# Patient Record
Sex: FEMALE | Race: WHITE | NOT HISPANIC OR LATINO | URBAN - METROPOLITAN AREA
[De-identification: names, ages, dates, MRNs, and addresses within clinical notes are randomized per-mention and may not be internally consistent; named-entity substitution may affect disease eponyms.]

---

## 2023-06-12 ENCOUNTER — APPOINTMENT (OUTPATIENT)
Dept: PRIMARY CARE | Facility: CLINIC | Age: 48
End: 2023-06-12
Payer: COMMERCIAL

## 2023-06-14 ENCOUNTER — OFFICE VISIT (OUTPATIENT)
Dept: PRIMARY CARE | Facility: CLINIC | Age: 48
End: 2023-06-14
Payer: COMMERCIAL

## 2023-06-14 VITALS
HEIGHT: 67 IN | SYSTOLIC BLOOD PRESSURE: 112 MMHG | BODY MASS INDEX: 24.55 KG/M2 | WEIGHT: 156.4 LBS | HEART RATE: 78 BPM | DIASTOLIC BLOOD PRESSURE: 72 MMHG

## 2023-06-14 DIAGNOSIS — R07.89 CHEST DISCOMFORT: ICD-10-CM

## 2023-06-14 DIAGNOSIS — Z15.89 MTHFR MUTATION: Primary | ICD-10-CM

## 2023-06-14 DIAGNOSIS — Z82.49 FAMILY HISTORY OF ABDOMINAL AORTIC ANEURYSM (AAA): ICD-10-CM

## 2023-06-14 DIAGNOSIS — R10.9 ABDOMINAL DISCOMFORT: ICD-10-CM

## 2023-06-14 PROBLEM — E55.9 VITAMIN D DEFICIENCY: Status: ACTIVE | Noted: 2023-06-14

## 2023-06-14 PROCEDURE — 1036F TOBACCO NON-USER: CPT | Performed by: FAMILY MEDICINE

## 2023-06-14 PROCEDURE — 99214 OFFICE O/P EST MOD 30 MIN: CPT | Performed by: FAMILY MEDICINE

## 2023-06-14 RX ORDER — PYRIDOXINE HCL (VITAMIN B6) 100 MG
50 TABLET ORAL DAILY
COMMUNITY

## 2023-06-14 RX ORDER — LANOLIN ALCOHOL/MO/W.PET/CERES
1000 CREAM (GRAM) TOPICAL DAILY
COMMUNITY

## 2023-06-14 RX ORDER — FOLIC ACID 1 MG/1
1 TABLET ORAL DAILY
COMMUNITY

## 2023-06-14 RX ORDER — ASPIRIN 81 MG/1
81 TABLET ORAL DAILY
COMMUNITY

## 2023-06-14 ASSESSMENT — ENCOUNTER SYMPTOMS
FATIGUE: 0
BACK PAIN: 0
CHEST TIGHTNESS: 0
ARTHRALGIAS: 0
CHOKING: 0
FACIAL ASYMMETRY: 0
HEADACHES: 0
ACTIVITY CHANGE: 0
COLOR CHANGE: 0
LIGHT-HEADEDNESS: 0
ABDOMINAL PAIN: 1
DIZZINESS: 0
COUGH: 0
APPETITE CHANGE: 0
FEVER: 0
MYALGIAS: 0
PALPITATIONS: 0
ABDOMINAL DISTENTION: 1

## 2023-06-14 NOTE — PROGRESS NOTES
"Subjective   Patient ID: Jacqueline Boswell is a 48 y.o. female who presents for To discuss CT scan to check for aortic aneursym. .    HPI   Patient reports that her father was just told he had a aortic aneurysm.  She is very concerned about this and wants screened for an aortic aneurysm.    She explains that her Dad had a thoracic and abdominal aortic aneurysm. She was told that his abdominal aortic aneurysm ruptured. She is very concerned that she might have something like that.   He was a smoker.    His mom was 70 when paternal grandomother  of a brain aneurysm.     Review of Systems   Constitutional:  Negative for activity change, appetite change, fatigue and fever.   HENT:  Negative for congestion.    Respiratory:  Negative for cough, choking and chest tightness.    Cardiovascular:  Negative for chest pain, palpitations and leg swelling.   Gastrointestinal:  Positive for abdominal distention and abdominal pain.   Musculoskeletal:  Negative for arthralgias, back pain, gait problem and myalgias.   Skin:  Negative for color change and pallor.   Neurological:  Negative for dizziness, facial asymmetry, light-headedness and headaches.       Objective   /72 (BP Location: Left arm)   Pulse 78   Ht 1.702 m (5' 7\")   Wt 70.9 kg (156 lb 6.4 oz)   BMI 24.50 kg/m²   BSA Body surface area is 1.83 meters squared.      Physical Exam  Constitutional:       General: She is not in acute distress.     Appearance: Normal appearance. She is not toxic-appearing.   HENT:      Head: Normocephalic.      Right Ear: Tympanic membrane, ear canal and external ear normal.      Left Ear: Tympanic membrane, ear canal and external ear normal.      Nose: Nose normal.      Mouth/Throat:      Pharynx: Oropharynx is clear.   Eyes:      Conjunctiva/sclera: Conjunctivae normal.      Pupils: Pupils are equal, round, and reactive to light.   Cardiovascular:      Rate and Rhythm: Normal rate and regular rhythm.      Pulses: Normal pulses. "      Heart sounds: Normal heart sounds.   Pulmonary:      Effort: No respiratory distress.      Breath sounds: No wheezing, rhonchi or rales.   Musculoskeletal:         General: No swelling or tenderness.      Cervical back: No tenderness.   Skin:     Findings: No lesion or rash.   Neurological:      General: No focal deficit present.      Mental Status: She is alert and oriented to person, place, and time. Mental status is at baseline.      Gait: Gait normal.   Psychiatric:         Mood and Affect: Mood normal.         Behavior: Behavior normal.         Thought Content: Thought content normal.         Judgment: Judgment normal.       [facility] Encounter on 11/17/2022   Component Date Value Ref Range Status    WBC 11/17/2022 6.6  4.4 - 11.3 x10E9/L Final    nRBC 11/17/2022 0.0  0.0 - 0.0 /100 WBC Final    RBC 11/17/2022 3.93 (L)  4.00 - 5.20 x10E12/L Final    Hemoglobin 11/17/2022 13.1  12.0 - 16.0 g/dL Final    Hematocrit 11/17/2022 38.4  36.0 - 46.0 % Final    MCV 11/17/2022 98  80 - 100 fL Final    MCHC 11/17/2022 34.1  32.0 - 36.0 g/dL Final    Platelets 11/17/2022 314  150 - 450 x10E9/L Final    RDW 11/17/2022 12.4  11.5 - 14.5 % Final    Neutrophils % 11/17/2022 68.9  40.0 - 80.0 % Final    Immature Granulocytes %, Automated 11/17/2022 0.3  0.0 - 0.9 % Final    Comment:  Immature Granulocyte Count (IG) includes promyelocytes,    myelocytes and metamyelocytes but does not include bands.   Percent differential counts (%) should be interpreted in the   context of the absolute cell counts (cells/L).      Lymphocytes % 11/17/2022 24.8  13.0 - 44.0 % Final    Monocytes % 11/17/2022 5.0  2.0 - 10.0 % Final    Eosinophils % 11/17/2022 0.5  0.0 - 6.0 % Final    Basophils % 11/17/2022 0.5  0.0 - 2.0 % Final    Neutrophils Absolute 11/17/2022 4.56  1.20 - 7.70 x10E9/L Final    Lymphocytes Absolute 11/17/2022 1.64  1.20 - 4.80 x10E9/L Final    Monocytes Absolute 11/17/2022 0.33  0.10 - 1.00 x10E9/L Final    Eosinophils  Absolute 11/17/2022 0.03  0.00 - 0.70 x10E9/L Final    Basophils Absolute 11/17/2022 0.03  0.00 - 0.10 x10E9/L Final    Vitamin D, 25-Hydroxy 11/17/2022 37  ng/mL Final    Comment: .  DEFICIENCY:         < 20   NG/ML  INSUFFICIENCY:      20-29  NG/ML  SUFFICIENCY:         NG/ML    THIS ASSAY ACCURATELY QUANTIFIES THE SUM OF  VITAMIN D3, 25-HYDROXY AND VIT D2,25-HYDROXY.      Glucose 11/17/2022 85  74 - 99 mg/dL Final    Sodium 11/17/2022 141  136 - 145 mmol/L Final    Potassium 11/17/2022 4.2  3.5 - 5.3 mmol/L Final    Chloride 11/17/2022 103  98 - 107 mmol/L Final    Bicarbonate 11/17/2022 29  21 - 32 mmol/L Final    Anion Gap 11/17/2022 13  10 - 20 mmol/L Final    Urea Nitrogen 11/17/2022 17  6 - 23 mg/dL Final    Creatinine 11/17/2022 0.83  0.50 - 1.05 mg/dL Final    GFR Female 11/17/2022 87  >90 mL/min/1.73m2 Final    Comment:  CALCULATIONS OF ESTIMATED GFR ARE PERFORMED   USING THE 2021 CKD-EPI STUDY REFIT EQUATION   WITHOUT THE RACE VARIABLE FOR THE IDMS-TRACEABLE   CREATININE METHODS.    https://jasn.asnjournals.org/content/early/2021/09/22/ASN.5541437380      Calcium 11/17/2022 9.8  8.6 - 10.6 mg/dL Final    Albumin 11/17/2022 4.5  3.4 - 5.0 g/dL Final    Alkaline Phosphatase 11/17/2022 60  33 - 110 U/L Final    Total Protein 11/17/2022 7.0  6.4 - 8.2 g/dL Final    AST 11/17/2022 19  9 - 39 U/L Final    Total Bilirubin 11/17/2022 0.7  0.0 - 1.2 mg/dL Final    ALT (SGPT) 11/17/2022 18  7 - 45 U/L Final    Comment:  Patients treated with Sulfasalazine may generate    falsely decreased results for ALT.      Cholesterol 11/17/2022 233 (H)  0 - 199 mg/dL Final    Comment: .      AGE      DESIRABLE   BORDERLINE HIGH   HIGH     0-19 Y     0 - 169       170 - 199     >/= 200    20-24 Y     0 - 189       190 - 224     >/= 225         >24 Y     0 - 199       200 - 239     >/= 240   **All ranges are based on fasting samples. Specific   therapeutic targets will vary based on patient-specific   cardiac risk.  .    Pediatric guidelines reference:Pediatrics 2011, 128(S5).   Adult guidelines reference: NCEP ATPIII Guidelines,     YESENIA 2001, 258:2486-97  .   Venipuncture immediately after or during the    administration of Metamizole may lead to falsely   low results. Testing should be performed immediately   prior to Metamizole dosing.      HDL 11/17/2022 74.5  mg/dL Final    Comment: .      AGE      VERY LOW   LOW     NORMAL    HIGH       0-19 Y       < 35   < 40     40-45     ----    20-24 Y       ----   < 40       >45     ----      >24 Y       ----   < 40     40-60      >60  .      Cholesterol/HDL Ratio 11/17/2022 3.1   Final    Comment: REF VALUES  DESIRABLE  < 3.4  HIGH RISK  > 5.0      LDL 11/17/2022 145 (H)  0 - 99 mg/dL Final    Comment: .                           NEAR      BORD      AGE      DESIRABLE  OPTIMAL    HIGH     HIGH     VERY HIGH     0-19 Y     0 - 109     ---    110-129   >/= 130     ----    20-24 Y     0 - 119     ---    120-159   >/= 160     ----      >24 Y     0 -  99   100-129  130-159   160-189     >/=190  .      VLDL 11/17/2022 13  0 - 40 mg/dL Final    Triglycerides 11/17/2022 66  0 - 149 mg/dL Final    Comment: .      AGE      DESIRABLE   BORDERLINE HIGH   HIGH     VERY HIGH   0 D-90 D    19 - 174         ----         ----        ----  91 D- 9 Y     0 -  74        75 -  99     >/= 100      ----    10-19 Y     0 -  89        90 - 129     >/= 130      ----    20-24 Y     0 - 114       115 - 149     >/= 150      ----         >24 Y     0 - 149       150 - 199    200- 499    >/= 500  .   Venipuncture immediately after or during the    administration of Metamizole may lead to falsely   low results. Testing should be performed immediately   prior to Metamizole dosing.      TSH 11/17/2022 1.61  0.44 - 3.98 mIU/L Final    Comment:  TSH testing is performed using different testing    methodology at [facility] than at other    [facility]. Direct result comparisons should    only be made  within the same method.       Current Outpatient Medications on File Prior to Visit   Medication Sig Dispense Refill    aspirin 81 mg EC tablet Take 1 tablet (81 mg) by mouth once daily.      cyanocobalamin (Vitamin B-12) 1,000 mcg tablet Take 1 tablet (1,000 mcg) by mouth once daily.      folic acid (Folvite) 1 mg tablet Take 1 tablet (1 mg) by mouth once daily.      pyridoxine (Vitamin B-6) 100 mg tablet Take 0.5 tablets (50 mg) by mouth once daily.       No current facility-administered medications on file prior to visit.     No images are attached to the encounter.            Assessment/Plan   Diagnoses and all orders for this visit:  MTHFR mutation (CMS/HCC)  Abdominal discomfort  Chest discomfort  1.  Because of dad's recent abdominal aortic aneurysm rupture patient would like to know what her risk is is going to be referred to genetic counseling at [facility] for aneurysm  2.  Patient to have CT abdomen and pelvis as well as chest for rule out aneurysm  3.  Patient to call if questions or concerns

## 2023-06-27 ENCOUNTER — TELEPHONE (OUTPATIENT)
Dept: PRIMARY CARE | Facility: CLINIC | Age: 48
End: 2023-06-27
Payer: COMMERCIAL

## 2023-06-27 DIAGNOSIS — Z82.49 FAMILY HISTORY OF ABDOMINAL AORTIC ANEURYSM (AAA): ICD-10-CM

## 2023-06-27 DIAGNOSIS — R07.89 CHEST DISCOMFORT: ICD-10-CM

## 2023-06-27 NOTE — TELEPHONE ENCOUNTER
This was from the email, do you want me to schedule her with Cardiology next door ?   I knew that this would get denied. Patient was aware that this probably would get a night as well. Can we try to do instead a echocardiogram and a ultrasound of abdomen to rule out aneurysm for both scans if this does not go through insurance then we will have to send her to cardiology so that they can get imaging For patient.     I am not going to do the peer to peer because it will not get covered. I did already talk to patient and tell her at the visit when I ordered the scans that this would probably be the case.

## 2023-06-27 NOTE — TELEPHONE ENCOUNTER
Pt called because she had a CT scheduled for tomorrow and just found out the insurance denied it because they had not heard back from you, Pt would like to know what she should do and if it can be resolved and to notify her of that.

## 2023-07-13 ENCOUNTER — TELEPHONE (OUTPATIENT)
Dept: PRIMARY CARE | Facility: CLINIC | Age: 48
End: 2023-07-13
Payer: COMMERCIAL

## 2024-05-16 ENCOUNTER — TELEPHONE (OUTPATIENT)
Dept: PRIMARY CARE | Facility: CLINIC | Age: 49
End: 2024-05-16
Payer: COMMERCIAL

## 2024-05-16 DIAGNOSIS — Z00.00 HEALTHCARE MAINTENANCE: ICD-10-CM

## 2024-05-16 DIAGNOSIS — E55.9 VITAMIN D DEFICIENCY: ICD-10-CM

## 2024-05-16 PROBLEM — E78.5 DYSLIPIDEMIA, GOAL LDL BELOW 100: Status: ACTIVE | Noted: 2024-05-16

## 2024-05-16 PROBLEM — R94.31 ABNORMAL ECG: Status: ACTIVE | Noted: 2024-05-16

## 2024-05-16 PROBLEM — I70.0 ARTERIOSCLEROSIS OF AORTA (CMS-HCC): Status: ACTIVE | Noted: 2024-05-16

## 2024-07-03 ENCOUNTER — LAB (OUTPATIENT)
Dept: LAB | Facility: LAB | Age: 49
End: 2024-07-03
Payer: COMMERCIAL

## 2024-07-03 DIAGNOSIS — Z00.00 HEALTHCARE MAINTENANCE: ICD-10-CM

## 2024-07-03 DIAGNOSIS — E55.9 VITAMIN D DEFICIENCY: ICD-10-CM

## 2024-07-03 LAB
25(OH)D3 SERPL-MCNC: 54 NG/ML (ref 30–100)
ALBUMIN SERPL BCP-MCNC: 4.9 G/DL (ref 3.4–5)
ALP SERPL-CCNC: 59 U/L (ref 33–110)
ALT SERPL W P-5'-P-CCNC: 22 U/L (ref 7–45)
ANION GAP SERPL CALC-SCNC: 14 MMOL/L (ref 10–20)
AST SERPL W P-5'-P-CCNC: 20 U/L (ref 9–39)
BASOPHILS # BLD AUTO: 0.03 X10*3/UL (ref 0–0.1)
BASOPHILS NFR BLD AUTO: 0.8 %
BILIRUB SERPL-MCNC: 0.8 MG/DL (ref 0–1.2)
BUN SERPL-MCNC: 14 MG/DL (ref 6–23)
CALCIUM SERPL-MCNC: 9.8 MG/DL (ref 8.6–10.6)
CHLORIDE SERPL-SCNC: 106 MMOL/L (ref 98–107)
CHOLEST SERPL-MCNC: 194 MG/DL (ref 0–199)
CHOLESTEROL/HDL RATIO: 2.2
CO2 SERPL-SCNC: 28 MMOL/L (ref 21–32)
CREAT SERPL-MCNC: 0.91 MG/DL (ref 0.5–1.05)
EGFRCR SERPLBLD CKD-EPI 2021: 77 ML/MIN/1.73M*2
EOSINOPHIL # BLD AUTO: 0.03 X10*3/UL (ref 0–0.7)
EOSINOPHIL NFR BLD AUTO: 0.8 %
ERYTHROCYTE [DISTWIDTH] IN BLOOD BY AUTOMATED COUNT: 12.5 % (ref 11.5–14.5)
GLUCOSE SERPL-MCNC: 96 MG/DL (ref 74–99)
HCT VFR BLD AUTO: 37.9 % (ref 36–46)
HDLC SERPL-MCNC: 87.1 MG/DL
HGB BLD-MCNC: 12.7 G/DL (ref 12–16)
IMM GRANULOCYTES # BLD AUTO: 0 X10*3/UL (ref 0–0.7)
IMM GRANULOCYTES NFR BLD AUTO: 0 % (ref 0–0.9)
LDLC SERPL CALC-MCNC: 94 MG/DL
LYMPHOCYTES # BLD AUTO: 1.53 X10*3/UL (ref 1.2–4.8)
LYMPHOCYTES NFR BLD AUTO: 39.3 %
MCH RBC QN AUTO: 32 PG (ref 26–34)
MCHC RBC AUTO-ENTMCNC: 33.5 G/DL (ref 32–36)
MCV RBC AUTO: 96 FL (ref 80–100)
MONOCYTES # BLD AUTO: 0.27 X10*3/UL (ref 0.1–1)
MONOCYTES NFR BLD AUTO: 6.9 %
NEUTROPHILS # BLD AUTO: 2.03 X10*3/UL (ref 1.2–7.7)
NEUTROPHILS NFR BLD AUTO: 52.2 %
NON HDL CHOLESTEROL: 107 MG/DL (ref 0–149)
NRBC BLD-RTO: 0 /100 WBCS (ref 0–0)
PLATELET # BLD AUTO: 268 X10*3/UL (ref 150–450)
POTASSIUM SERPL-SCNC: 4.1 MMOL/L (ref 3.5–5.3)
PROT SERPL-MCNC: 6.9 G/DL (ref 6.4–8.2)
RBC # BLD AUTO: 3.97 X10*6/UL (ref 4–5.2)
SODIUM SERPL-SCNC: 144 MMOL/L (ref 136–145)
TRIGL SERPL-MCNC: 64 MG/DL (ref 0–149)
TSH SERPL-ACNC: 2.16 MIU/L (ref 0.44–3.98)
VLDL: 13 MG/DL (ref 0–40)
WBC # BLD AUTO: 3.9 X10*3/UL (ref 4.4–11.3)

## 2024-07-03 PROCEDURE — 84443 ASSAY THYROID STIM HORMONE: CPT

## 2024-07-03 PROCEDURE — 36415 COLL VENOUS BLD VENIPUNCTURE: CPT

## 2024-07-03 PROCEDURE — 80053 COMPREHEN METABOLIC PANEL: CPT

## 2024-07-03 PROCEDURE — 85025 COMPLETE CBC W/AUTO DIFF WBC: CPT

## 2024-07-03 PROCEDURE — 82306 VITAMIN D 25 HYDROXY: CPT

## 2024-07-03 PROCEDURE — 80061 LIPID PANEL: CPT

## 2024-07-19 ENCOUNTER — APPOINTMENT (OUTPATIENT)
Dept: PRIMARY CARE | Facility: CLINIC | Age: 49
End: 2024-07-19
Payer: COMMERCIAL

## 2024-07-19 VITALS
DIASTOLIC BLOOD PRESSURE: 72 MMHG | HEIGHT: 67 IN | SYSTOLIC BLOOD PRESSURE: 118 MMHG | HEART RATE: 89 BPM | BODY MASS INDEX: 24.64 KG/M2 | WEIGHT: 157 LBS

## 2024-07-19 DIAGNOSIS — R79.89 ABNORMAL CBC: ICD-10-CM

## 2024-07-19 DIAGNOSIS — Z15.89 MTHFR MUTATION: ICD-10-CM

## 2024-07-19 DIAGNOSIS — E72.12 METHYLENETETRAHYDROFOLATE REDUCTASE (MTHFR) DEFICIENCY (MULTI): ICD-10-CM

## 2024-07-19 DIAGNOSIS — Z12.31 ENCOUNTER FOR SCREENING MAMMOGRAM FOR MALIGNANT NEOPLASM OF BREAST: ICD-10-CM

## 2024-07-19 DIAGNOSIS — E78.5 DYSLIPIDEMIA, GOAL LDL BELOW 100: ICD-10-CM

## 2024-07-19 DIAGNOSIS — E55.9 VITAMIN D DEFICIENCY: ICD-10-CM

## 2024-07-19 DIAGNOSIS — I70.0 ARTERIOSCLEROSIS OF AORTA (CMS-HCC): ICD-10-CM

## 2024-07-19 DIAGNOSIS — Z00.00 HEALTHCARE MAINTENANCE: Primary | ICD-10-CM

## 2024-07-19 PROCEDURE — 3008F BODY MASS INDEX DOCD: CPT | Performed by: FAMILY MEDICINE

## 2024-07-19 PROCEDURE — 1036F TOBACCO NON-USER: CPT | Performed by: FAMILY MEDICINE

## 2024-07-19 PROCEDURE — 99396 PREV VISIT EST AGE 40-64: CPT | Performed by: FAMILY MEDICINE

## 2024-07-19 RX ORDER — ROSUVASTATIN CALCIUM 10 MG/1
10 TABLET, COATED ORAL NIGHTLY
COMMUNITY

## 2024-07-19 ASSESSMENT — ENCOUNTER SYMPTOMS
FACIAL ASYMMETRY: 0
HEADACHES: 0
LIGHT-HEADEDNESS: 0
ARTHRALGIAS: 0
COLOR CHANGE: 0
COUGH: 0
APPETITE CHANGE: 0
CHEST TIGHTNESS: 0
PALPITATIONS: 0
FATIGUE: 0
FEVER: 0
ACTIVITY CHANGE: 0
DIZZINESS: 0
BACK PAIN: 0
CHOKING: 0

## 2024-07-19 NOTE — PROGRESS NOTES
"Subjective   Patient ID: Jacqueline Boswell is a 49 y.o. female who presents for Annual Exam.    HPI   Patient presents for physical exam.      Fam Hx  MGF melanoma, squamous cell CA, basal cell CA  Mom (67) living,  Dad (75) living,  Sister () living, melanoma  Sister () living,      Exercise runs 1/2 marathons, walks, swims  ETOH once a week  Caffeine 1 can a day of soda  Tobacco denies, 2nd hand from dad     Dr. Suárez, dermatology  OB/GYN Dr. Ted Agustin, hematologist     Mammogram due 7-2024  DEXA 2024 due at age 64  Colonoscopy 2021 Dr. Hagen due 2024     adopted daughter     Past Med Hx  MTHFR  endometriosis     Past Surg Hx  3 D+C's  laparoscopy     Patient denies other complaints.  Review of Systems   Constitutional:  Negative for activity change, appetite change, fatigue and fever.   HENT:  Negative for congestion.    Respiratory:  Negative for cough, choking and chest tightness.    Cardiovascular:  Negative for chest pain, palpitations and leg swelling.   Musculoskeletal:  Negative for arthralgias, back pain and gait problem.   Skin:  Negative for color change and pallor.   Neurological:  Negative for dizziness, facial asymmetry, light-headedness and headaches.       Objective   /72 (BP Location: Right arm, Patient Position: Sitting)   Pulse 89   Ht 1.695 m (5' 6.75\")   Wt 71.2 kg (157 lb)   BMI 24.77 kg/m²   BSA Body surface area is 1.83 meters squared.      Physical Exam  Constitutional:       General: She is not in acute distress.     Appearance: Normal appearance. She is not toxic-appearing.   HENT:      Head: Normocephalic.      Right Ear: Tympanic membrane, ear canal and external ear normal.      Left Ear: Tympanic membrane, ear canal and external ear normal.   Eyes:      Conjunctiva/sclera: Conjunctivae normal.      Pupils: Pupils are equal, round, and reactive to light.   Cardiovascular:      Rate and Rhythm: Normal rate and regular rhythm.      Pulses: Normal pulses.      Heart " sounds: Normal heart sounds.   Pulmonary:      Effort: No respiratory distress.      Breath sounds: No wheezing, rhonchi or rales.   Abdominal:      General: Bowel sounds are normal. There is no distension.      Palpations: Abdomen is soft.      Tenderness: There is no abdominal tenderness.   Musculoskeletal:         General: No swelling or tenderness.   Skin:     Findings: No lesion or rash.   Neurological:      General: No focal deficit present.      Mental Status: She is alert and oriented to person, place, and time. Mental status is at baseline.      Gait: Gait normal.   Psychiatric:         Mood and Affect: Mood normal.         Behavior: Behavior normal.         Thought Content: Thought content normal.         Judgment: Judgment normal.       Lab on 07/03/2024   Component Date Value Ref Range Status    WBC 07/03/2024 3.9 (L)  4.4 - 11.3 x10*3/uL Final    nRBC 07/03/2024 0.0  0.0 - 0.0 /100 WBCs Final    RBC 07/03/2024 3.97 (L)  4.00 - 5.20 x10*6/uL Final    Hemoglobin 07/03/2024 12.7  12.0 - 16.0 g/dL Final    Hematocrit 07/03/2024 37.9  36.0 - 46.0 % Final    MCV 07/03/2024 96  80 - 100 fL Final    MCH 07/03/2024 32.0  26.0 - 34.0 pg Final    MCHC 07/03/2024 33.5  32.0 - 36.0 g/dL Final    RDW 07/03/2024 12.5  11.5 - 14.5 % Final    Platelets 07/03/2024 268  150 - 450 x10*3/uL Final    Neutrophils % 07/03/2024 52.2  40.0 - 80.0 % Final    Immature Granulocytes %, Automated 07/03/2024 0.0  0.0 - 0.9 % Final    Immature Granulocyte Count (IG) includes promyelocytes, myelocytes and metamyelocytes but does not include bands. Percent differential counts (%) should be interpreted in the context of the absolute cell counts (cells/UL).    Lymphocytes % 07/03/2024 39.3  13.0 - 44.0 % Final    Monocytes % 07/03/2024 6.9  2.0 - 10.0 % Final    Eosinophils % 07/03/2024 0.8  0.0 - 6.0 % Final    Basophils % 07/03/2024 0.8  0.0 - 2.0 % Final    Neutrophils Absolute 07/03/2024 2.03  1.20 - 7.70 x10*3/uL Final    Percent  differential counts (%) should be interpreted in the context of the absolute cell counts (cells/uL).    Immature Granulocytes Absolute, Au* 07/03/2024 0.00  0.00 - 0.70 x10*3/uL Final    Lymphocytes Absolute 07/03/2024 1.53  1.20 - 4.80 x10*3/uL Final    Monocytes Absolute 07/03/2024 0.27  0.10 - 1.00 x10*3/uL Final    Eosinophils Absolute 07/03/2024 0.03  0.00 - 0.70 x10*3/uL Final    Basophils Absolute 07/03/2024 0.03  0.00 - 0.10 x10*3/uL Final    Glucose 07/03/2024 96  74 - 99 mg/dL Final    Sodium 07/03/2024 144  136 - 145 mmol/L Final    Potassium 07/03/2024 4.1  3.5 - 5.3 mmol/L Final    Chloride 07/03/2024 106  98 - 107 mmol/L Final    Bicarbonate 07/03/2024 28  21 - 32 mmol/L Final    Anion Gap 07/03/2024 14  10 - 20 mmol/L Final    Urea Nitrogen 07/03/2024 14  6 - 23 mg/dL Final    Creatinine 07/03/2024 0.91  0.50 - 1.05 mg/dL Final    eGFR 07/03/2024 77  >60 mL/min/1.73m*2 Final    Calculations of estimated GFR are performed using the 2021 CKD-EPI Study Refit equation without the race variable for the IDMS-Traceable creatinine methods.  https://jasn.asnjournals.org/content/early/2021/09/22/ASN.6016989400    Calcium 07/03/2024 9.8  8.6 - 10.6 mg/dL Final    Albumin 07/03/2024 4.9  3.4 - 5.0 g/dL Final    Alkaline Phosphatase 07/03/2024 59  33 - 110 U/L Final    Total Protein 07/03/2024 6.9  6.4 - 8.2 g/dL Final    AST 07/03/2024 20  9 - 39 U/L Final    Bilirubin, Total 07/03/2024 0.8  0.0 - 1.2 mg/dL Final    ALT 07/03/2024 22  7 - 45 U/L Final    Patients treated with Sulfasalazine may generate falsely decreased results for ALT.    Cholesterol 07/03/2024 194  0 - 199 mg/dL Final          Age      Desirable   Borderline High   High     0-19 Y     0 - 169       170 - 199     >/= 200    20-24 Y     0 - 189       190 - 224     >/= 225         >24 Y     0 - 199       200 - 239     >/= 240   **All ranges are based on fasting samples. Specific   therapeutic targets will vary based on patient-specific   cardiac  risk.    Pediatric guidelines reference:Pediatrics 2011, 128(S5).Adult guidelines reference: NCEP ATPIII Guidelines,YESENIA 2001, 258:7936-    Venipuncture immediately after or during the administration of Metamizole may lead to falsely low results. Testing should be performed immediately prior to Metamizole dosing.    HDL-Cholesterol 07/03/2024 87.1  mg/dL Final      Age       Very Low   Low     Normal    High    0-19 Y    < 35      < 40     40-45     ----  20-24 Y    ----     < 40      >45      ----        >24 Y      ----     < 40     40-60      >60      Cholesterol/HDL Ratio 07/03/2024 2.2   Final      Ref Values  Desirable  < 3.4  High Risk  > 5.0    LDL Calculated 07/03/2024 94  <=99 mg/dL Final                                Near   Borderline      AGE      Desirable  Optimal    High     High     Very High     0-19 Y     0 - 109     ---    110-129   >/= 130     ----    20-24 Y     0 - 119     ---    120-159   >/= 160     ----      >24 Y     0 -  99   100-129  130-159   160-189     >/=190      VLDL 07/03/2024 13  0 - 40 mg/dL Final    Triglycerides 07/03/2024 64  0 - 149 mg/dL Final       Age         Desirable   Borderline High   High     Very High   0 D-90 D    19 - 174         ----         ----        ----  91 D- 9 Y     0 -  74        75 -  99     >/= 100      ----    10-19 Y     0 -  89        90 - 129     >/= 130      ----    20-24 Y     0 - 114       115 - 149     >/= 150      ----         >24 Y     0 - 149       150 - 199    200- 499    >/= 500    Venipuncture immediately after or during the administration of Metamizole may lead to falsely low results. Testing should be performed immediately prior to Metamizole dosing.    Non HDL Cholesterol 07/03/2024 107  0 - 149 mg/dL Final          Age       Desirable   Borderline High   High     Very High     0-19 Y     0 - 119       120 - 144     >/= 145    >/= 160    20-24 Y     0 - 149       150 - 189     >/= 190      ----         >24 Y    30 mg/dL above LDL  Cholesterol goal      Thyroid Stimulating Hormone 07/03/2024 2.16  0.44 - 3.98 mIU/L Final    Vitamin D, 25-Hydroxy, Total 07/03/2024 54  30 - 100 ng/mL Final   [facility] Encounter on 07/25/2023   Component Date Value Ref Range Status    Ventricular Rate 07/25/2023 72  BPM Final    Atrial Rate 07/25/2023 72  BPM Final    DC Interval 07/25/2023 178  ms Final    QRS Duration 07/25/2023 104  ms Final    QT Interval 07/25/2023 400  ms Final    QTC Calculation(Bazett) 07/25/2023 438  ms Final    P Axis 07/25/2023 79  degrees Final    R Axis 07/25/2023 78  degrees Final    T Axis 07/25/2023 65  degrees Final    QRS Count 07/25/2023 12  beats Final    Q Onset 07/25/2023 220  ms Final    P Onset 07/25/2023 131  ms Final    P Offset 07/25/2023 182  ms Final    T Offset 07/25/2023 420  ms Final    QTC Fredericia 07/25/2023 425  ms Final     Current Outpatient Medications on File Prior to Visit   Medication Sig Dispense Refill    aspirin 81 mg EC tablet Take 1 tablet (81 mg) by mouth once daily.      cyanocobalamin (Vitamin B-12) 1,000 mcg tablet Take 1 tablet (1,000 mcg) by mouth once daily.      folic acid (Folvite) 1 mg tablet Take 1 tablet (1 mg) by mouth once daily.      pyridoxine (Vitamin B-6) 100 mg tablet Take 0.5 tablets (50 mg) by mouth once daily.      rosuvastatin (Crestor) 10 mg tablet Take 1 tablet (10 mg) by mouth once daily at bedtime.       No current facility-administered medications on file prior to visit.     No images are attached to the encounter.        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 Patient's blood work discussed at this office visit  2. Patient's LDL  goal <130, current LDL 94, continue TYPE II diet  3. Mammogram due 7-2024  4. DEXA 2024 due at age 64  5. Colonoscopy 2021 Dr. Hagen due 2024  6. Patient to call if questions or concerns

## 2025-07-17 ENCOUNTER — TELEPHONE (OUTPATIENT)
Dept: PRIMARY CARE | Facility: CLINIC | Age: 50
End: 2025-07-17

## 2025-07-17 DIAGNOSIS — Z00.00 HEALTHCARE MAINTENANCE: ICD-10-CM

## 2025-07-17 DIAGNOSIS — E55.9 VITAMIN D DEFICIENCY: ICD-10-CM

## 2025-09-22 ENCOUNTER — APPOINTMENT (OUTPATIENT)
Dept: PRIMARY CARE | Facility: CLINIC | Age: 50
End: 2025-09-22
Payer: COMMERCIAL